# Patient Record
Sex: FEMALE | Race: WHITE | NOT HISPANIC OR LATINO | URBAN - NONMETROPOLITAN AREA
[De-identification: names, ages, dates, MRNs, and addresses within clinical notes are randomized per-mention and may not be internally consistent; named-entity substitution may affect disease eponyms.]

---

## 2023-09-07 ENCOUNTER — LAB OUTSIDE AN ENCOUNTER (OUTPATIENT)
Dept: URBAN - NONMETROPOLITAN AREA CLINIC 4 | Facility: CLINIC | Age: 78
End: 2023-09-07

## 2023-09-07 ENCOUNTER — OFFICE VISIT (OUTPATIENT)
Dept: URBAN - NONMETROPOLITAN AREA CLINIC 4 | Facility: CLINIC | Age: 78
End: 2023-09-07
Payer: MEDICARE

## 2023-09-07 ENCOUNTER — WEB ENCOUNTER (OUTPATIENT)
Dept: URBAN - NONMETROPOLITAN AREA CLINIC 4 | Facility: CLINIC | Age: 78
End: 2023-09-07

## 2023-09-07 VITALS
WEIGHT: 176.8 LBS | HEART RATE: 113 BPM | BODY MASS INDEX: 28.42 KG/M2 | DIASTOLIC BLOOD PRESSURE: 93 MMHG | HEIGHT: 66 IN | TEMPERATURE: 97.5 F | SYSTOLIC BLOOD PRESSURE: 105 MMHG

## 2023-09-07 DIAGNOSIS — M17.4 OTHER SECONDARY OSTEOARTHRITIS OF BOTH KNEES: ICD-10-CM

## 2023-09-07 DIAGNOSIS — K51.00 ULCERATIVE PANCOLITIS: ICD-10-CM

## 2023-09-07 PROBLEM — 239873007: Status: ACTIVE | Noted: 2023-09-07

## 2023-09-07 PROCEDURE — 99204 OFFICE O/P NEW MOD 45 MIN: CPT | Performed by: INTERNAL MEDICINE

## 2023-09-07 PROCEDURE — 99244 OFF/OP CNSLTJ NEW/EST MOD 40: CPT | Performed by: INTERNAL MEDICINE

## 2023-09-07 RX ORDER — DIPHENHYDRAMINE HYDROCHLORIDE 25 MG/1
1 CAPSULE AT BEDTIME AS NEEDED CAPSULE ORAL ONCE A DAY
Status: ACTIVE | COMMUNITY

## 2023-09-07 RX ORDER — UBIDECARENONE 100 MG
AS DIRECTED CAPSULE ORAL
Status: ACTIVE | COMMUNITY

## 2023-09-07 RX ORDER — MONTELUKAST 10 MG/1
1 TABLET TABLET, FILM COATED ORAL ONCE A DAY
Status: ACTIVE | COMMUNITY

## 2023-09-07 RX ORDER — ZINC GLUCONATE 50 MG
1 TABLET TABLET ORAL ONCE A DAY
Status: ACTIVE | COMMUNITY

## 2023-09-07 RX ORDER — AMLODIPINE BESYLATE 5 MG/1
1 TABLET TABLET ORAL ONCE A DAY
Status: ACTIVE | COMMUNITY

## 2023-09-07 RX ORDER — LEVOTHYROXINE SODIUM 137 UG/1
1 TABLET IN THE MORNING ON AN EMPTY STOMACH TABLET ORAL ONCE A DAY
Status: ACTIVE | COMMUNITY

## 2023-09-07 RX ORDER — SOD SULF/POT CHLORIDE/MAG SULF 1.479 G
12 TABLETS THE FIRST DOSE THE EVENING BEFORE AND SECOND DOSE THE MORNING OF COLONOSCOPY TABLET ORAL TWICE A DAY
Qty: 24 | OUTPATIENT
Start: 2023-09-07 | End: 2023-09-08

## 2023-09-07 RX ORDER — DULOXETINE 20 MG/1
1 CAPSULE CAPSULE, DELAYED RELEASE PELLETS ORAL TWICE A DAY
Status: ACTIVE | COMMUNITY

## 2023-09-07 RX ORDER — GABAPENTIN 300 MG/1
1 CAPSULE CAPSULE ORAL ONCE A DAY
Status: ACTIVE | COMMUNITY

## 2023-09-07 NOTE — HPI-TODAY'S VISIT:
Patient reports that she is changing physicians.  Pt reports that she has been seeing her GI doctor since around the year 2000.  Pt reports that she was seeing Dr. Anand.  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   Pt reports that her father was a binge drinking.  Pt reports that she suffered for years.  Pt reports that around 1996- was official diagnosis. Prescribed sulfasalazine- placed on that medication for a week.  Pt reports that she has been  on the 5asa since that time. Pt reports that the medication worked ok until a few years ago.. pt was prescribed something else which did not help- Sulfasalazine has worked just fine. Pt reports that recently she has noted soft stools, pudding like.   Pt reports that she initially thought she was lactose intolerance- started on almond milk- increased gaseousness as well. Mother had colon cancer age 45- Overdue for c scope.  Pt reports that she had a brother requiring ileostomy in 40's.  Younger brother has IBD as well.   Pt reports that he was on azacol.   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 Samanta Caballero for eval and tx. A copy will be sent

## 2023-09-08 LAB
A/G RATIO: 2.2
ABSOLUTE BASOPHILS: 31
ABSOLUTE EOSINOPHILS: 200
ABSOLUTE LYMPHOCYTES: 1856
ABSOLUTE MONOCYTES: 770
ABSOLUTE NEUTROPHILS: 4843
ALBUMIN: 4.3
ALKALINE PHOSPHATASE: 77
ALT (SGPT): 24
AST (SGOT): 30
BASOPHILS: 0.4
BILIRUBIN, TOTAL: 0.7
BUN/CREATININE RATIO: (no result)
BUN: 11
C-REACTIVE PROTEIN, QUANT: 0.3
CALCIUM: 9.7
CARBON DIOXIDE, TOTAL: 28
CHLORIDE: 107
CREATININE: 0.93
EGFR: 64
EOSINOPHILS: 2.6
FOLATE (FOLIC ACID), SERUM: >24
GLOBULIN, TOTAL: 2
GLUCOSE: 83
HEMATOCRIT: 42.2
HEMOGLOBIN: 12.7
LYMPHOCYTES: 24.1
MCH: 27.7
MCHC: 30.1
MCV: 91.9
MONOCYTES: 10
MPV: 9.3
NEUTROPHILS: 62.9
PLATELET COUNT: 193
POTASSIUM: 4.1
PROTEIN, TOTAL: 6.3
RDW: 13.7
RED BLOOD CELL COUNT: 4.59
SODIUM: 142
VITAMIN B12: 388
VITAMIN D,25-OH,TOTAL,IA: 48
WHITE BLOOD CELL COUNT: 7.7

## 2023-10-02 ENCOUNTER — LAB OUTSIDE AN ENCOUNTER (OUTPATIENT)
Dept: URBAN - METROPOLITAN AREA CLINIC 54 | Facility: CLINIC | Age: 78
End: 2023-10-02

## 2023-10-07 LAB
CALPROTECTIN, FECAL: 408
CAMPYLOBACTER GROUP: NOT DETECTED
NOROVIRUS GI/GII: NOT DETECTED
ROTAVIRUS A: NOT DETECTED
SALMONELLA SPECIES: NOT DETECTED
SHIGA TOXIN 1: NOT DETECTED
SHIGA TOXIN 2: NOT DETECTED
SHIGELLA SPECIES: NOT DETECTED
VIBRIO GROUP: NOT DETECTED
YERSINIA ENTEROCOLITICA: NOT DETECTED

## 2023-10-18 ENCOUNTER — OFFICE VISIT (OUTPATIENT)
Dept: URBAN - NONMETROPOLITAN AREA CLINIC 4 | Facility: CLINIC | Age: 78
End: 2023-10-18
Payer: MEDICARE

## 2023-10-18 ENCOUNTER — OFFICE VISIT (OUTPATIENT)
Dept: URBAN - NONMETROPOLITAN AREA CLINIC 4 | Facility: CLINIC | Age: 78
End: 2023-10-18

## 2023-10-18 ENCOUNTER — LAB OUTSIDE AN ENCOUNTER (OUTPATIENT)
Dept: URBAN - NONMETROPOLITAN AREA CLINIC 4 | Facility: CLINIC | Age: 78
End: 2023-10-18

## 2023-10-18 VITALS
HEART RATE: 80 BPM | DIASTOLIC BLOOD PRESSURE: 71 MMHG | TEMPERATURE: 97.7 F | BODY MASS INDEX: 28.35 KG/M2 | WEIGHT: 176.4 LBS | SYSTOLIC BLOOD PRESSURE: 141 MMHG | HEIGHT: 66 IN

## 2023-10-18 DIAGNOSIS — K51.00 ULCERATIVE PANCOLITIS: ICD-10-CM

## 2023-10-18 DIAGNOSIS — Z86.010 PERSONAL HISTORY OF COLONIC POLYPS: ICD-10-CM

## 2023-10-18 DIAGNOSIS — R19.5 ELEVATED FECAL CALPROTECTIN: ICD-10-CM

## 2023-10-18 DIAGNOSIS — M17.4 OTHER SECONDARY OSTEOARTHRITIS OF BOTH KNEES: ICD-10-CM

## 2023-10-18 PROBLEM — 428283002: Status: ACTIVE | Noted: 2023-10-18

## 2023-10-18 PROBLEM — 444548001: Status: ACTIVE | Noted: 2023-09-07

## 2023-10-18 PROCEDURE — 99214 OFFICE O/P EST MOD 30 MIN: CPT | Performed by: REGISTERED NURSE

## 2023-10-18 RX ORDER — AMLODIPINE BESYLATE 5 MG/1
1 TABLET TABLET ORAL ONCE A DAY
Status: ACTIVE | COMMUNITY

## 2023-10-18 RX ORDER — DULOXETINE 20 MG/1
1 CAPSULE CAPSULE, DELAYED RELEASE PELLETS ORAL TWICE A DAY
Status: ACTIVE | COMMUNITY

## 2023-10-18 RX ORDER — UBIDECARENONE 100 MG
AS DIRECTED CAPSULE ORAL
Status: ACTIVE | COMMUNITY

## 2023-10-18 RX ORDER — GABAPENTIN 300 MG/1
1 CAPSULE CAPSULE ORAL ONCE A DAY
Status: ACTIVE | COMMUNITY

## 2023-10-18 RX ORDER — DIPHENHYDRAMINE HYDROCHLORIDE 25 MG/1
1 CAPSULE AT BEDTIME AS NEEDED CAPSULE ORAL ONCE A DAY
Status: ACTIVE | COMMUNITY

## 2023-10-18 RX ORDER — LEVOTHYROXINE SODIUM 137 UG/1
1 TABLET IN THE MORNING ON AN EMPTY STOMACH TABLET ORAL ONCE A DAY
Status: ACTIVE | COMMUNITY

## 2023-10-18 RX ORDER — SOD SULF/POT CHLORIDE/MAG SULF 1.479 G
12 TABLETS THE FIRST DOSE THE EVENING BEFORE AND SECOND DOSE THE MORNING OF COLONOSCOPY TABLET ORAL TWICE A DAY
Qty: 24 TABLET | Refills: 0 | OUTPATIENT
Start: 2023-10-18 | End: 2023-10-19

## 2023-10-18 RX ORDER — MONTELUKAST 10 MG/1
1 TABLET TABLET, FILM COATED ORAL ONCE A DAY
Status: ACTIVE | COMMUNITY

## 2023-10-18 RX ORDER — ZINC GLUCONATE 50 MG
1 TABLET TABLET ORAL ONCE A DAY
Status: ACTIVE | COMMUNITY

## 2023-10-18 RX ORDER — SULFASALAZINE 500 MG/1
2 TABLETS TABLET, DELAYED RELEASE ORAL THREE TIMES A DAY
Qty: 180 TABLET | Refills: 5

## 2023-10-18 RX ORDER — SULFASALAZINE 500 MG/1
2 TABLETS TABLET, DELAYED RELEASE ORAL THREE TIMES A DAY
Status: ACTIVE | COMMUNITY

## 2023-10-18 RX ORDER — FOLIC ACID 1 MG/1
1 TABLET TABLET ORAL ONCE A DAY
Qty: 30 TABLET | Refills: 5

## 2023-10-18 RX ORDER — FOLIC ACID 1 MG/1
1 TABLET TABLET ORAL ONCE A DAY
Status: ACTIVE | COMMUNITY

## 2023-10-18 NOTE — HPI-TODAY'S VISIT:
Patient reports that she is changing physicians.  Pt reports that she has been seeing her GI doctor since around the year 2000.  Pt reports that she was seeing Dr. Anand.  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   Pt reports that her father was a binge drinking.  Pt reports that she suffered for years.  Pt reports that around 1996- was official diagnosis. Prescribed sulfasalazine- placed on that medication for a week.  Pt reports that she has been  on the 5asa since that time. Pt reports that the medication worked ok until a few years ago.. pt was prescribed something else which did not help- Sulfasalazine has worked just fine. Pt reports that recently she has noted soft stools, pudding like.   Pt reports that she initially thought she was lactose intolerance- started on almond milk- increased gaseousness as well. Mother had colon cancer age 45- Overdue for c scope.  Pt reports that she had a brother requiring ileostomy in 40's.  Younger brother has IBD as well.   Pt reports that he was on azacol.   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 Samanta Caballero for eval and tx. A copy will be sent  10/18/23: Pt RTC for f/u. Fecal helena elevated at 408. GPP negative. CBC, CMP, CRP, Vit D, Vit B12 and folate wnl. last cscope 10/2020 with no active UC, 4 polyps. Past due for surveillance. She is currently taking sulfasalzine 3 g daily and folid acid 1 mg daily. She reports pudding like stools. Has anywhere from 0-3 BMs daily. Denies hematochezia. Reports foul smelling gas. She takes phazyme prn. Admits her diet is not great and she eats out a lot.

## 2023-10-27 ENCOUNTER — TELEPHONE ENCOUNTER (OUTPATIENT)
Dept: URBAN - NONMETROPOLITAN AREA CLINIC 4 | Facility: CLINIC | Age: 78
End: 2023-10-27

## 2023-11-10 ENCOUNTER — OFFICE VISIT (OUTPATIENT)
Dept: URBAN - METROPOLITAN AREA SURGERY CENTER 14 | Facility: SURGERY CENTER | Age: 78
End: 2023-11-10

## 2023-11-27 ENCOUNTER — OFFICE VISIT (OUTPATIENT)
Dept: URBAN - NONMETROPOLITAN AREA CLINIC 4 | Facility: CLINIC | Age: 78
End: 2023-11-27

## 2023-12-07 ENCOUNTER — OFFICE VISIT (OUTPATIENT)
Dept: URBAN - NONMETROPOLITAN AREA CLINIC 4 | Facility: CLINIC | Age: 78
End: 2023-12-07

## 2023-12-15 ENCOUNTER — OUT OF OFFICE VISIT (OUTPATIENT)
Dept: URBAN - METROPOLITAN AREA SURGERY CENTER 14 | Facility: SURGERY CENTER | Age: 78
End: 2023-12-15
Payer: MEDICARE

## 2023-12-15 ENCOUNTER — CLAIMS CREATED FROM THE CLAIM WINDOW (OUTPATIENT)
Dept: URBAN - METROPOLITAN AREA CLINIC 4 | Facility: CLINIC | Age: 78
End: 2023-12-15
Payer: MEDICARE

## 2023-12-15 ENCOUNTER — CLAIMS CREATED FROM THE CLAIM WINDOW (OUTPATIENT)
Dept: URBAN - METROPOLITAN AREA SURGERY CENTER 14 | Facility: SURGERY CENTER | Age: 78
End: 2023-12-15

## 2023-12-15 DIAGNOSIS — K51.00 ACUTE ULCERATIVE PANCOLITIS: ICD-10-CM

## 2023-12-15 DIAGNOSIS — D12.4 ADENOMA OF DESCENDING COLON: ICD-10-CM

## 2023-12-15 DIAGNOSIS — D12.4 ADENOMATOUS POLYP OF DESCENDING COLON: ICD-10-CM

## 2023-12-15 DIAGNOSIS — K64.4 SKIN TAG OF PERIANAL REGION: ICD-10-CM

## 2023-12-15 DIAGNOSIS — K63.89 OTHER SPECIFIED DISEASES OF INTESTINE: ICD-10-CM

## 2023-12-15 DIAGNOSIS — K57.30 COLON, DIVERTICULOSIS: ICD-10-CM

## 2023-12-15 DIAGNOSIS — K51.00 CHRONIC ULCERATIVE PANCOLITIS: ICD-10-CM

## 2023-12-15 PROCEDURE — 45380 COLONOSCOPY AND BIOPSY: CPT | Performed by: INTERNAL MEDICINE

## 2023-12-15 PROCEDURE — 00811 ANES LWR INTST NDSC NOS: CPT

## 2023-12-15 PROCEDURE — 88305 TISSUE EXAM BY PATHOLOGIST: CPT | Performed by: PATHOLOGY

## 2023-12-15 PROCEDURE — G8907 PT DOC NO EVENTS ON DISCHARG: HCPCS | Performed by: INTERNAL MEDICINE

## 2024-01-11 ENCOUNTER — OFFICE VISIT (OUTPATIENT)
Dept: URBAN - NONMETROPOLITAN AREA CLINIC 4 | Facility: CLINIC | Age: 79
End: 2024-01-11
Payer: MEDICARE

## 2024-01-11 VITALS
DIASTOLIC BLOOD PRESSURE: 73 MMHG | TEMPERATURE: 97.9 F | WEIGHT: 172.2 LBS | HEART RATE: 72 BPM | BODY MASS INDEX: 27.68 KG/M2 | SYSTOLIC BLOOD PRESSURE: 138 MMHG | HEIGHT: 66 IN

## 2024-01-11 DIAGNOSIS — K51.00 ULCERATIVE PANCOLITIS: ICD-10-CM

## 2024-01-11 DIAGNOSIS — Z86.010 PERSONAL HISTORY OF COLONIC POLYPS: ICD-10-CM

## 2024-01-11 DIAGNOSIS — M17.4 OTHER SECONDARY OSTEOARTHRITIS OF BOTH KNEES: ICD-10-CM

## 2024-01-11 DIAGNOSIS — R19.5 ELEVATED FECAL CALPROTECTIN: ICD-10-CM

## 2024-01-11 PROCEDURE — 99214 OFFICE O/P EST MOD 30 MIN: CPT | Performed by: PHYSICIAN ASSISTANT

## 2024-01-11 RX ORDER — ZINC GLUCONATE 50 MG
1 TABLET TABLET ORAL ONCE A DAY
Status: ACTIVE | COMMUNITY

## 2024-01-11 RX ORDER — GABAPENTIN 300 MG/1
1 CAPSULE CAPSULE ORAL ONCE A DAY
Status: ACTIVE | COMMUNITY

## 2024-01-11 RX ORDER — LEVOTHYROXINE SODIUM 137 UG/1
1 TABLET IN THE MORNING ON AN EMPTY STOMACH TABLET ORAL ONCE A DAY
Status: ACTIVE | COMMUNITY

## 2024-01-11 RX ORDER — UBIDECARENONE 100 MG
AS DIRECTED CAPSULE ORAL
Status: ACTIVE | COMMUNITY

## 2024-01-11 RX ORDER — FOLIC ACID 1 MG/1
1 TABLET TABLET ORAL ONCE A DAY
Qty: 30 TABLET | Refills: 5

## 2024-01-11 RX ORDER — OMEGA-3S/DHA/EPA/FISH OIL 1000-1400
AS DIRECTED CAPSULE,DELAYED RELEASE (ENTERIC COATED) ORAL DAILY
Qty: 30 | Refills: 3 | OUTPATIENT
Start: 2024-01-11 | End: 2024-05-10

## 2024-01-11 RX ORDER — FOLIC ACID 1 MG/1
1 TABLET TABLET ORAL ONCE A DAY
Qty: 30 TABLET | Refills: 5 | Status: ACTIVE | COMMUNITY

## 2024-01-11 RX ORDER — AMLODIPINE BESYLATE 5 MG/1
1 TABLET TABLET ORAL ONCE A DAY
Status: ACTIVE | COMMUNITY

## 2024-01-11 RX ORDER — DULOXETINE 20 MG/1
1 CAPSULE CAPSULE, DELAYED RELEASE PELLETS ORAL TWICE A DAY
Status: DISCONTINUED | COMMUNITY

## 2024-01-11 RX ORDER — ROSUVASTATIN CALCIUM 20 MG/1
1 TABLET TABLET, COATED ORAL ONCE A DAY
Status: ACTIVE | COMMUNITY

## 2024-01-11 RX ORDER — SULFASALAZINE 500 MG/1
2 TABLETS TABLET, DELAYED RELEASE ORAL THREE TIMES A DAY
Qty: 180 TABLET | Refills: 5

## 2024-01-11 RX ORDER — DIPHENHYDRAMINE HYDROCHLORIDE 25 MG/1
1 CAPSULE AT BEDTIME AS NEEDED CAPSULE ORAL ONCE A DAY
Status: ACTIVE | COMMUNITY

## 2024-01-11 RX ORDER — SULFASALAZINE 500 MG/1
2 TABLETS TABLET, DELAYED RELEASE ORAL TWICE A DAY
Qty: 120 TABLET | Refills: 5 | Status: ACTIVE | COMMUNITY

## 2024-01-11 RX ORDER — MONTELUKAST 10 MG/1
1 TABLET TABLET, FILM COATED ORAL ONCE A DAY
Status: ACTIVE | COMMUNITY

## 2024-01-11 NOTE — HPI-TODAY'S VISIT:
Patient reports that she is changing physicians.  Pt reports that she has been seeing her GI doctor since around the year 2000.  Pt reports that she was seeing Dr. Anand.  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   Pt reports that her father was a binge drinking.  Pt reports that she suffered for years.  Pt reports that around 1996- was official diagnosis. Prescribed sulfasalazine- placed on that medication for a week.  Pt reports that she has been  on the 5asa since that time. Pt reports that the medication worked ok until a few years ago.. pt was prescribed something else which did not help- Sulfasalazine has worked just fine. Pt reports that recently she has noted soft stools, pudding like.   Pt reports that she initially thought she was lactose intolerance- started on almond milk- increased gaseousness as well. Mother had colon cancer age 45- Overdue for c scope.  Pt reports that she had a brother requiring ileostomy in 40's.  Younger brother has IBD as well.   Pt reports that he was on azacol.   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 Samanta Caballero for eval and tx. A copy will be sent  10/18/23: Pt RTC for f/u. Fecal helena elevated at 408. GPP negative. CBC, CMP, CRP, Vit D, Vit B12 and folate wnl. last cscope 10/2020 with no active UC, 4 polyps. Past due for surveillance. She is currently taking sulfasalzine 3 g daily and folid acid 1 mg daily. She reports pudding like stools. Has anywhere from 0-3 BMs daily. Denies hematochezia. Reports foul smelling gas. She takes phazyme prn. Admits her diet is not great and she eats out a lot.   1.11.24 discussed cscope with normal random colon bx as well TA s/p removal  maintained on Sulfasalazine for years for suspected UC  cscope from previous Dr. Anand nor our team with evidence of active disease

## 2024-03-11 ENCOUNTER — OV EP (OUTPATIENT)
Dept: URBAN - NONMETROPOLITAN AREA CLINIC 4 | Facility: CLINIC | Age: 79
End: 2024-03-11

## 2024-03-11 RX ORDER — ROSUVASTATIN CALCIUM 20 MG/1
1 TABLET TABLET, COATED ORAL ONCE A DAY
COMMUNITY

## 2024-03-11 RX ORDER — AMLODIPINE BESYLATE 5 MG/1
1 TABLET TABLET ORAL ONCE A DAY
COMMUNITY

## 2024-03-11 RX ORDER — LEVOTHYROXINE SODIUM 137 UG/1
1 TABLET IN THE MORNING ON AN EMPTY STOMACH TABLET ORAL ONCE A DAY
COMMUNITY

## 2024-03-11 RX ORDER — SULFASALAZINE 500 MG/1
2 TABLETS TABLET, DELAYED RELEASE ORAL THREE TIMES A DAY
Qty: 180 TABLET | Refills: 5 | COMMUNITY

## 2024-03-11 RX ORDER — MONTELUKAST 10 MG/1
1 TABLET TABLET, FILM COATED ORAL ONCE A DAY
COMMUNITY

## 2024-03-11 RX ORDER — GABAPENTIN 300 MG/1
1 CAPSULE CAPSULE ORAL ONCE A DAY
COMMUNITY

## 2024-03-11 RX ORDER — UBIDECARENONE 100 MG
AS DIRECTED CAPSULE ORAL
COMMUNITY

## 2024-03-11 RX ORDER — DIPHENHYDRAMINE HYDROCHLORIDE 25 MG/1
1 CAPSULE AT BEDTIME AS NEEDED CAPSULE ORAL ONCE A DAY
COMMUNITY

## 2024-03-11 RX ORDER — OMEGA-3S/DHA/EPA/FISH OIL 1000-1400
AS DIRECTED CAPSULE,DELAYED RELEASE (ENTERIC COATED) ORAL DAILY
Qty: 30 | Refills: 3 | COMMUNITY
Start: 2024-01-11 | End: 2024-05-10

## 2024-03-11 RX ORDER — FOLIC ACID 1 MG/1
1 TABLET TABLET ORAL ONCE A DAY
Qty: 30 TABLET | Refills: 5 | COMMUNITY

## 2024-03-11 RX ORDER — ZINC GLUCONATE 50 MG
1 TABLET TABLET ORAL ONCE A DAY
COMMUNITY

## 2024-03-14 ENCOUNTER — OV EP (OUTPATIENT)
Dept: URBAN - NONMETROPOLITAN AREA CLINIC 4 | Facility: CLINIC | Age: 79
End: 2024-03-14
Payer: MEDICARE

## 2024-03-14 VITALS
DIASTOLIC BLOOD PRESSURE: 93 MMHG | WEIGHT: 166.4 LBS | SYSTOLIC BLOOD PRESSURE: 168 MMHG | BODY MASS INDEX: 26.74 KG/M2 | HEIGHT: 66 IN | HEART RATE: 84 BPM | TEMPERATURE: 98.2 F

## 2024-03-14 DIAGNOSIS — R19.5 ELEVATED FECAL CALPROTECTIN: ICD-10-CM

## 2024-03-14 DIAGNOSIS — M17.4 OTHER SECONDARY OSTEOARTHRITIS OF BOTH KNEES: ICD-10-CM

## 2024-03-14 DIAGNOSIS — K51.00 ULCERATIVE PANCOLITIS: ICD-10-CM

## 2024-03-14 DIAGNOSIS — Z86.010 PERSONAL HISTORY OF COLONIC POLYPS: ICD-10-CM

## 2024-03-14 PROCEDURE — 99214 OFFICE O/P EST MOD 30 MIN: CPT | Performed by: PHYSICIAN ASSISTANT

## 2024-03-14 RX ORDER — OZANIMOD HYDROCHLORIDE 0.23-0.46
AS DIRECTED KIT ORAL DAILY
Qty: 1 PACK | Refills: 0 | OUTPATIENT
Start: 2024-03-14 | End: 2024-04-13

## 2024-03-14 RX ORDER — LEVOTHYROXINE SODIUM 137 UG/1
1 TABLET IN THE MORNING ON AN EMPTY STOMACH TABLET ORAL ONCE A DAY
COMMUNITY

## 2024-03-14 RX ORDER — SULFASALAZINE 500 MG/1
2 TABLETS TABLET, DELAYED RELEASE ORAL THREE TIMES A DAY
Qty: 180 TABLET | Refills: 5 | COMMUNITY

## 2024-03-14 RX ORDER — UBIDECARENONE 100 MG
AS DIRECTED CAPSULE ORAL
COMMUNITY

## 2024-03-14 RX ORDER — ZINC GLUCONATE 50 MG
1 TABLET TABLET ORAL ONCE A DAY
COMMUNITY

## 2024-03-14 RX ORDER — OMEGA-3S/DHA/EPA/FISH OIL 1000-1400
AS DIRECTED CAPSULE,DELAYED RELEASE (ENTERIC COATED) ORAL DAILY
Qty: 30 | Refills: 3 | COMMUNITY
Start: 2024-01-11 | End: 2024-05-10

## 2024-03-14 RX ORDER — AMLODIPINE BESYLATE 5 MG/1
1 TABLET TABLET ORAL ONCE A DAY
COMMUNITY

## 2024-03-14 RX ORDER — MONTELUKAST 10 MG/1
1 TABLET TABLET, FILM COATED ORAL ONCE A DAY
COMMUNITY

## 2024-03-14 RX ORDER — OZANIMOD HYDROCHLORIDE 0.92 MG/1
1 CAPSULE CAPSULE ORAL ONCE A DAY
Qty: 30 | OUTPATIENT
Start: 2024-03-14 | End: 2024-04-13

## 2024-03-14 RX ORDER — GABAPENTIN 300 MG/1
1 CAPSULE CAPSULE ORAL ONCE A DAY
COMMUNITY

## 2024-03-14 RX ORDER — DIPHENHYDRAMINE HYDROCHLORIDE 25 MG/1
1 CAPSULE AT BEDTIME AS NEEDED CAPSULE ORAL ONCE A DAY
COMMUNITY

## 2024-03-14 RX ORDER — ROSUVASTATIN CALCIUM 20 MG/1
1 TABLET TABLET, COATED ORAL ONCE A DAY
COMMUNITY

## 2024-03-14 RX ORDER — FOLIC ACID 1 MG/1
1 TABLET TABLET ORAL ONCE A DAY
Qty: 30 TABLET | Refills: 5 | COMMUNITY

## 2024-03-14 NOTE — HPI-TODAY'S VISIT:
Patient reports that she is changing physicians.  Pt reports that she has been seeing her GI doctor since around the year 2000.  Pt reports that she was seeing Dr. Anand.  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   Pt reports that her father was a binge drinking.  Pt reports that she suffered for years.  Pt reports that around 1996- was official diagnosis. Prescribed sulfasalazine- placed on that medication for a week.  Pt reports that she has been  on the 5asa since that time. Pt reports that the medication worked ok until a few years ago.. pt was prescribed something else which did not help- Sulfasalazine has worked just fine. Pt reports that recently she has noted soft stools, pudding like.   Pt reports that she initially thought she was lactose intolerance- started on almond milk- increased gaseousness as well. Mother had colon cancer age 45- Overdue for c scope.  Pt reports that she had a brother requiring ileostomy in 40's.  Younger brother has IBD as well.   Pt reports that he was on azacol.   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 Samanta Caballero for eval and tx. A copy will be sent  10/18/23: Pt RTC for f/u. Fecal helena elevated at 408. GPP negative. CBC, CMP, CRP, Vit D, Vit B12 and folate wnl. last cscope 10/2020 with no active UC, 4 polyps. Past due for surveillance. She is currently taking sulfasalzine 3 g daily and folid acid 1 mg daily. She reports pudding like stools. Has anywhere from 0-3 BMs daily. Denies hematochezia. Reports foul smelling gas. She takes phazyme prn. Admits her diet is not great and she eats out a lot.   1.11.24 discussed cscope with normal random colon bx as well TA s/p removal  maintained on Sulfasalazine for years for suspected UC  cscope from previous Dr. Anand nor our team with evidence of active disease  3.14.24 Having urgency and excessive gas with looser stools, and notices her symptoms are not as controlled as they were 2-3 years ago  Fecal helena also elevated at 457  Prev on Inflixmab when diagnosed, when her disease was severe  also has a brother with en ileostomy due to severe pan UC

## 2024-05-02 ENCOUNTER — TELEPHONE ENCOUNTER (OUTPATIENT)
Dept: URBAN - NONMETROPOLITAN AREA CLINIC 4 | Facility: CLINIC | Age: 79
End: 2024-05-02

## 2024-05-16 ENCOUNTER — TELEPHONE ENCOUNTER (OUTPATIENT)
Dept: URBAN - NONMETROPOLITAN AREA CLINIC 4 | Facility: CLINIC | Age: 79
End: 2024-05-16

## 2024-06-12 ENCOUNTER — ERX REFILL RESPONSE (OUTPATIENT)
Dept: URBAN - NONMETROPOLITAN AREA CLINIC 4 | Facility: CLINIC | Age: 79
End: 2024-06-12

## 2024-06-12 RX ORDER — OZANIMOD HYDROCHLORIDE 0.92 MG/1
TAKE 1 CAPSULE BY MOUTH EVERY DAY CAPSULE ORAL
Qty: 30 CAPSULE | Refills: 3 | OUTPATIENT

## 2024-06-12 RX ORDER — OZANIMOD HYDROCHLORIDE 0.92 MG/1
TAKE 1 CAPSULE BY MOUTH EVERY DAY CAPSULE ORAL
Qty: 30 CAPSULE | Refills: 0 | OUTPATIENT

## 2024-06-13 ENCOUNTER — OFFICE VISIT (OUTPATIENT)
Dept: URBAN - NONMETROPOLITAN AREA CLINIC 4 | Facility: CLINIC | Age: 79
End: 2024-06-13
Payer: MEDICARE

## 2024-06-13 ENCOUNTER — DASHBOARD ENCOUNTERS (OUTPATIENT)
Age: 79
End: 2024-06-13

## 2024-06-13 VITALS
SYSTOLIC BLOOD PRESSURE: 132 MMHG | WEIGHT: 157 LBS | HEART RATE: 68 BPM | DIASTOLIC BLOOD PRESSURE: 69 MMHG | BODY MASS INDEX: 25.23 KG/M2 | HEIGHT: 66 IN | TEMPERATURE: 98.4 F

## 2024-06-13 DIAGNOSIS — M17.4 OTHER SECONDARY OSTEOARTHRITIS OF BOTH KNEES: ICD-10-CM

## 2024-06-13 DIAGNOSIS — K51.00 ULCERATIVE PANCOLITIS: ICD-10-CM

## 2024-06-13 DIAGNOSIS — Z86.010 PERSONAL HISTORY OF COLONIC POLYPS: ICD-10-CM

## 2024-06-13 DIAGNOSIS — R19.5 ELEVATED FECAL CALPROTECTIN: ICD-10-CM

## 2024-06-13 PROCEDURE — 99214 OFFICE O/P EST MOD 30 MIN: CPT | Performed by: PHYSICIAN ASSISTANT

## 2024-06-13 RX ORDER — FOLIC ACID 1 MG/1
1 TABLET TABLET ORAL ONCE A DAY
Qty: 30 TABLET | Refills: 5

## 2024-06-13 RX ORDER — OZANIMOD HYDROCHLORIDE 0.92 MG/1
TAKE 1 CAPSULE BY MOUTH EVERY DAY CAPSULE ORAL
Qty: 30 CAPSULE | Refills: 3 | Status: ACTIVE | COMMUNITY

## 2024-06-13 RX ORDER — SULFASALAZINE 500 MG/1
2 TABLETS TABLET, DELAYED RELEASE ORAL THREE TIMES A DAY
Qty: 180 TABLET | Refills: 5 | Status: DISCONTINUED | COMMUNITY

## 2024-06-13 RX ORDER — LEVOTHYROXINE SODIUM 137 UG/1
1 TABLET IN THE MORNING ON AN EMPTY STOMACH TABLET ORAL ONCE A DAY
COMMUNITY

## 2024-06-13 RX ORDER — ROSUVASTATIN CALCIUM 20 MG/1
1 TABLET TABLET, COATED ORAL ONCE A DAY
COMMUNITY

## 2024-06-13 RX ORDER — DIPHENHYDRAMINE HYDROCHLORIDE 25 MG/1
1 CAPSULE AT BEDTIME AS NEEDED CAPSULE ORAL ONCE A DAY
COMMUNITY

## 2024-06-13 RX ORDER — FOLIC ACID 1 MG/1
1 TABLET TABLET ORAL ONCE A DAY
Qty: 30 TABLET | Refills: 5 | Status: ACTIVE | COMMUNITY
End: 2024-10-16

## 2024-06-13 RX ORDER — ZINC GLUCONATE 50 MG
1 TABLET TABLET ORAL ONCE A DAY
COMMUNITY

## 2024-06-13 RX ORDER — AMLODIPINE BESYLATE 5 MG/1
1 TABLET TABLET ORAL ONCE A DAY
COMMUNITY

## 2024-06-13 RX ORDER — UBIDECARENONE 100 MG
AS DIRECTED CAPSULE ORAL
COMMUNITY

## 2024-06-13 RX ORDER — GABAPENTIN 300 MG/1
1 CAPSULE CAPSULE ORAL ONCE A DAY
Status: DISCONTINUED | COMMUNITY

## 2024-06-13 RX ORDER — MONTELUKAST 10 MG/1
1 TABLET TABLET, FILM COATED ORAL ONCE A DAY
COMMUNITY

## 2024-07-12 ENCOUNTER — TELEPHONE ENCOUNTER (OUTPATIENT)
Dept: URBAN - NONMETROPOLITAN AREA CLINIC 4 | Facility: CLINIC | Age: 79
End: 2024-07-12

## 2024-08-28 ENCOUNTER — TELEPHONE ENCOUNTER (OUTPATIENT)
Dept: URBAN - NONMETROPOLITAN AREA CLINIC 4 | Facility: CLINIC | Age: 79
End: 2024-08-28

## 2024-10-22 ENCOUNTER — ERX REFILL RESPONSE (OUTPATIENT)
Dept: URBAN - NONMETROPOLITAN AREA CLINIC 4 | Facility: CLINIC | Age: 79
End: 2024-10-22

## 2024-10-22 RX ORDER — OZANIMOD HYDROCHLORIDE 0.92 MG/1
TAKE 1 CAPSULE BY MOUTH EVERY DAY CAPSULE ORAL
Qty: 30 CAPSULE | Refills: 3 | OUTPATIENT

## 2024-12-11 ENCOUNTER — OFFICE VISIT (OUTPATIENT)
Dept: URBAN - NONMETROPOLITAN AREA CLINIC 4 | Facility: CLINIC | Age: 79
End: 2024-12-11
Payer: MEDICARE

## 2024-12-11 VITALS
HEART RATE: 69 BPM | TEMPERATURE: 98.1 F | SYSTOLIC BLOOD PRESSURE: 165 MMHG | BODY MASS INDEX: 22.98 KG/M2 | HEIGHT: 66 IN | DIASTOLIC BLOOD PRESSURE: 86 MMHG | WEIGHT: 143 LBS

## 2024-12-11 DIAGNOSIS — R19.5 ELEVATED FECAL CALPROTECTIN: ICD-10-CM

## 2024-12-11 DIAGNOSIS — M17.4 OTHER SECONDARY OSTEOARTHRITIS OF BOTH KNEES: ICD-10-CM

## 2024-12-11 DIAGNOSIS — Z86.0101 HISTORY OF ADENOMATOUS AND SERRATED COLON POLYPS: ICD-10-CM

## 2024-12-11 DIAGNOSIS — K51.00 ULCERATIVE PANCOLITIS: ICD-10-CM

## 2024-12-11 PROCEDURE — 99214 OFFICE O/P EST MOD 30 MIN: CPT | Performed by: PHYSICIAN ASSISTANT

## 2024-12-11 RX ORDER — FOLIC ACID 1 MG/1
1 TABLET TABLET ORAL ONCE A DAY
Qty: 30 TABLET | Refills: 5 | Status: ACTIVE | COMMUNITY

## 2024-12-11 RX ORDER — ROSUVASTATIN CALCIUM 20 MG/1
1 TABLET TABLET, COATED ORAL ONCE A DAY
Status: ACTIVE | COMMUNITY

## 2024-12-11 RX ORDER — OZANIMOD HYDROCHLORIDE 0.92 MG/1
TAKE 1 CAPSULE BY MOUTH EVERY DAY CAPSULE ORAL
Qty: 30 CAPSULE | Refills: 3 | Status: ACTIVE | COMMUNITY

## 2024-12-11 RX ORDER — MONTELUKAST 10 MG/1
1 TABLET TABLET, FILM COATED ORAL ONCE A DAY
Status: ACTIVE | COMMUNITY

## 2024-12-11 RX ORDER — ZINC GLUCONATE 50 MG
1 TABLET TABLET ORAL ONCE A DAY
Status: ACTIVE | COMMUNITY

## 2024-12-11 RX ORDER — FOLIC ACID 1 MG/1
1 TABLET TABLET ORAL ONCE A DAY
Qty: 30 TABLET | Refills: 5

## 2024-12-11 RX ORDER — DIPHENHYDRAMINE HYDROCHLORIDE 25 MG/1
1 CAPSULE AT BEDTIME AS NEEDED CAPSULE ORAL ONCE A DAY
Status: ACTIVE | COMMUNITY

## 2024-12-11 RX ORDER — UBIDECARENONE 100 MG
AS DIRECTED CAPSULE ORAL
Status: ACTIVE | COMMUNITY

## 2024-12-11 RX ORDER — LEVOTHYROXINE SODIUM 137 UG/1
1 TABLET IN THE MORNING ON AN EMPTY STOMACH TABLET ORAL ONCE A DAY
Status: ACTIVE | COMMUNITY

## 2024-12-11 RX ORDER — OZANIMOD HYDROCHLORIDE 0.92 MG/1
TAKE 1 CAPSULE BY MOUTH EVERY DAY CAPSULE ORAL ONCE A DAY
Qty: 90 CAPSULE | Refills: 3

## 2024-12-11 RX ORDER — AMLODIPINE BESYLATE 5 MG/1
1 TABLET TABLET ORAL ONCE A DAY
Status: ACTIVE | COMMUNITY

## 2024-12-11 NOTE — HPI-TODAY'S VISIT:
Patient reports that she is changing physicians.  Pt reports that she has been seeing her GI doctor since around the year 2000.  Pt reports that she was seeing Dr. Anand.  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   Pt reports that her father was a binge drinking.  Pt reports that she suffered for years.  Pt reports that around 1996- was official diagnosis. Prescribed sulfasalazine- placed on that medication for a week.  Pt reports that she has been  on the 5asa since that time. Pt reports that the medication worked ok until a few years ago.. pt was prescribed something else which did not help- Sulfasalazine has worked just fine. Pt reports that recently she has noted soft stools, pudding like.   Pt reports that she initially thought she was lactose intolerance- started on almond milk- increased gaseousness as well. Mother had colon cancer age 45- Overdue for c scope.  Pt reports that she had a brother requiring ileostomy in 40's.  Younger brother has IBD as well.   Pt reports that he was on azacol.   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 Samanta Caballero for eval and tx. A copy will be sent  10/18/23: Pt RTC for f/u. Fecal helena elevated at 408. GPP negative. CBC, CMP, CRP, Vit D, Vit B12 and folate wnl. last cscope 10/2020 with no active UC, 4 polyps. Past due for surveillance. She is currently taking sulfasalzine 3 g daily and folid acid 1 mg daily. She reports pudding like stools. Has anywhere from 0-3 BMs daily. Denies hematochezia. Reports foul smelling gas. She takes phazyme prn. Admits her diet is not great and she eats out a lot.   1.11.24 discussed cscope with normal random colon bx as well TA s/p removal  maintained on Sulfasalazine for years for suspected UC  cscope from previous Dr. Anand nor our team with evidence of active disease  3.14.24 Having urgency and excessive gas with looser stools, and notices her symptoms are not as controlled as they were 2-3 years ago  Fecal helena also elevated at 457  Prev on Inflixmab when diagnosed, when her disease was severe  also has a brother with en ileostomy due to severe pan UC  6.13.24 doing really well on Zeposia, on maintenance dosing now at 0.92 mcg daily fomed stools, no abd pain  transitioning to assisted living  12.11.24 Mervat is doing fantastically well!!  she is having normal formed stools with no abd pain her most recent cscope from 2024 with no evidence of active disease, tics and small TA- recall for 2028 noted  she is tolerating Zeposia with no issues

## 2024-12-12 ENCOUNTER — TELEPHONE ENCOUNTER (OUTPATIENT)
Dept: URBAN - NONMETROPOLITAN AREA CLINIC 4 | Facility: CLINIC | Age: 79
End: 2024-12-12

## 2025-01-28 ENCOUNTER — TELEPHONE ENCOUNTER (OUTPATIENT)
Dept: URBAN - NONMETROPOLITAN AREA CLINIC 4 | Facility: CLINIC | Age: 80
End: 2025-01-28

## 2025-01-28 RX ORDER — PLECANATIDE 3 MG/1
1 TABLET TABLET ORAL ONCE A DAY
Qty: 30 | OUTPATIENT
Start: 2025-01-29 | End: 2025-02-28

## 2025-01-31 ENCOUNTER — TELEPHONE ENCOUNTER (OUTPATIENT)
Dept: URBAN - METROPOLITAN AREA CLINIC 54 | Facility: CLINIC | Age: 80
End: 2025-01-31

## 2025-02-03 ENCOUNTER — TELEPHONE ENCOUNTER (OUTPATIENT)
Dept: URBAN - METROPOLITAN AREA CLINIC 54 | Facility: CLINIC | Age: 80
End: 2025-02-03

## 2025-02-05 ENCOUNTER — TELEPHONE ENCOUNTER (OUTPATIENT)
Dept: URBAN - NONMETROPOLITAN AREA CLINIC 4 | Facility: CLINIC | Age: 80
End: 2025-02-05

## 2025-02-07 ENCOUNTER — TELEPHONE ENCOUNTER (OUTPATIENT)
Dept: URBAN - NONMETROPOLITAN AREA CLINIC 4 | Facility: CLINIC | Age: 80
End: 2025-02-07

## 2025-02-12 ENCOUNTER — TELEPHONE ENCOUNTER (OUTPATIENT)
Dept: URBAN - NONMETROPOLITAN AREA CLINIC 4 | Facility: CLINIC | Age: 80
End: 2025-02-12

## 2025-02-12 RX ORDER — RIFAXIMIN 550 MG/1
1 TABLET TABLET ORAL THREE TIMES A DAY
Qty: 42 TABLET | Refills: 0 | OUTPATIENT
Start: 2025-02-13 | End: 2025-02-27

## 2025-02-14 ENCOUNTER — TELEPHONE ENCOUNTER (OUTPATIENT)
Dept: URBAN - METROPOLITAN AREA CLINIC 54 | Facility: CLINIC | Age: 80
End: 2025-02-14

## 2025-02-18 ENCOUNTER — TELEPHONE ENCOUNTER (OUTPATIENT)
Dept: URBAN - NONMETROPOLITAN AREA CLINIC 4 | Facility: CLINIC | Age: 80
End: 2025-02-18

## 2025-02-19 ENCOUNTER — OFFICE VISIT (OUTPATIENT)
Dept: URBAN - NONMETROPOLITAN AREA CLINIC 4 | Facility: CLINIC | Age: 80
End: 2025-02-19
Payer: MEDICARE

## 2025-02-19 VITALS
BODY MASS INDEX: 23.59 KG/M2 | SYSTOLIC BLOOD PRESSURE: 117 MMHG | HEIGHT: 66 IN | TEMPERATURE: 97.9 F | WEIGHT: 146.8 LBS | DIASTOLIC BLOOD PRESSURE: 68 MMHG | HEART RATE: 72 BPM

## 2025-02-19 DIAGNOSIS — M17.4 OTHER SECONDARY OSTEOARTHRITIS OF BOTH KNEES: ICD-10-CM

## 2025-02-19 DIAGNOSIS — R19.5 ELEVATED FECAL CALPROTECTIN: ICD-10-CM

## 2025-02-19 DIAGNOSIS — Z86.0101 H/O ADENOMATOUS POLYP OF COLON: ICD-10-CM

## 2025-02-19 DIAGNOSIS — K51.00 ULCERATIVE PANCOLITIS: ICD-10-CM

## 2025-02-19 PROCEDURE — 99214 OFFICE O/P EST MOD 30 MIN: CPT | Performed by: PHYSICIAN ASSISTANT

## 2025-02-19 RX ORDER — LEVOTHYROXINE SODIUM 137 UG/1
1 TABLET IN THE MORNING ON AN EMPTY STOMACH TABLET ORAL ONCE A DAY
Status: ACTIVE | COMMUNITY

## 2025-02-19 RX ORDER — OZANIMOD HYDROCHLORIDE 0.92 MG/1
TAKE 1 CAPSULE BY MOUTH EVERY DAY CAPSULE ORAL ONCE A DAY
Qty: 90 CAPSULE | Refills: 3 | Status: ACTIVE | COMMUNITY

## 2025-02-19 RX ORDER — RIFAXIMIN 550 MG/1
1 TABLET TABLET ORAL THREE TIMES A DAY
Qty: 42 TABLET | Refills: 0 | Status: ACTIVE | COMMUNITY
Start: 2025-02-13 | End: 2025-02-27

## 2025-02-19 RX ORDER — DIPHENHYDRAMINE HYDROCHLORIDE 25 MG/1
1 CAPSULE AT BEDTIME AS NEEDED CAPSULE ORAL ONCE A DAY
Status: ACTIVE | COMMUNITY

## 2025-02-19 RX ORDER — UBIDECARENONE 100 MG
AS DIRECTED CAPSULE ORAL
Status: ACTIVE | COMMUNITY

## 2025-02-19 RX ORDER — MONTELUKAST 10 MG/1
1 TABLET TABLET, FILM COATED ORAL ONCE A DAY
Status: ACTIVE | COMMUNITY

## 2025-02-19 RX ORDER — ROSUVASTATIN CALCIUM 20 MG/1
1 TABLET TABLET, COATED ORAL ONCE A DAY
Status: ACTIVE | COMMUNITY

## 2025-02-19 RX ORDER — PLECANATIDE 3 MG/1
1 TABLET TABLET ORAL ONCE A DAY
Qty: 30 | Status: ON HOLD | COMMUNITY
Start: 2025-01-29 | End: 2025-02-28

## 2025-02-19 RX ORDER — ZINC GLUCONATE 50 MG
1 TABLET TABLET ORAL ONCE A DAY
Status: ON HOLD | COMMUNITY

## 2025-02-19 RX ORDER — OZANIMOD HYDROCHLORIDE 0.92 MG/1
TAKE 1 CAPSULE BY MOUTH EVERY DAY CAPSULE ORAL ONCE A DAY
Qty: 90 CAPSULE | Refills: 3

## 2025-02-19 RX ORDER — FOLIC ACID 1 MG/1
1 TABLET TABLET ORAL ONCE A DAY
Qty: 30 TABLET | Refills: 5 | Status: ON HOLD | COMMUNITY

## 2025-02-19 RX ORDER — AMLODIPINE BESYLATE 5 MG/1
1 TABLET TABLET ORAL ONCE A DAY
Status: ACTIVE | COMMUNITY

## 2025-02-19 RX ORDER — FOLIC ACID 1 MG/1
1 TABLET TABLET ORAL ONCE A DAY
Qty: 30 TABLET | Refills: 5

## 2025-02-19 NOTE — HPI-TODAY'S VISIT:
Patient called stating that she has concerns regarding the renal panel that was done on Saturday. Viewed results on [facility], would like to discuss.    Please advise    [phone]    Patient reports that she is changing physicians.  Pt reports that she has been seeing her GI doctor since around the year 2000.  Pt reports that she was seeing Dr. Anand.  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   Pt reports that her father was a binge drinking.  Pt reports that she suffered for years.  Pt reports that around 1996- was official diagnosis. Prescribed sulfasalazine- placed on that medication for a week.  Pt reports that she has been  on the 5asa since that time. Pt reports that the medication worked ok until a few years ago.. pt was prescribed something else which did not help- Sulfasalazine has worked just fine. Pt reports that recently she has noted soft stools, pudding like.   Pt reports that she initially thought she was lactose intolerance- started on almond milk- increased gaseousness as well. Mother had colon cancer age 45- Overdue for c scope.  Pt reports that she had a brother requiring ileostomy in 40's.  Younger brother has IBD as well.   Pt reports that he was on azacol.   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 Samanta Caballero for eval and tx. A copy will be sent  10/18/23: Pt RTC for f/u. Fecal helena elevated at 408. GPP negative. CBC, CMP, CRP, Vit D, Vit B12 and folate wnl. last cscope 10/2020 with no active UC, 4 polyps. Past due for surveillance. She is currently taking sulfasalzine 3 g daily and folid acid 1 mg daily. She reports pudding like stools. Has anywhere from 0-3 BMs daily. Denies hematochezia. Reports foul smelling gas. She takes phazyme prn. Admits her diet is not great and she eats out a lot.   1.11.24 discussed cscope with normal random colon bx as well TA s/p removal  maintained on Sulfasalazine for years for suspected UC  cscope from previous Dr. Anand nor our team with evidence of active disease  3.14.24 Having urgency and excessive gas with looser stools, and notices her symptoms are not as controlled as they were 2-3 years ago  Fecal helena also elevated at 457  Prev on Inflixmab when diagnosed, when her disease was severe  also has a brother with en ileostomy due to severe pan UC  6.13.24 doing really well on Zeposia, on maintenance dosing now at 0.92 mcg daily fomed stools, no abd pain  transitioning to assisted living  12.11.24 Mervat is doing fantastically well!!  she is having normal formed stools with no abd pain her most recent cscope from 2024 with no evidence of active disease, tics and small TA- recall for 2028 noted  she is tolerating Zeposia with no issues  2.19.25 patient is doing great on Zeposia. Occasional gas and stool change, which makes it hard for complete emptying but no diarrhea  responded well with Xifaxan last time, and was given aain this time, but was not taking correctly

## 2025-03-03 ENCOUNTER — TELEPHONE ENCOUNTER (OUTPATIENT)
Dept: URBAN - NONMETROPOLITAN AREA CLINIC 4 | Facility: CLINIC | Age: 80
End: 2025-03-03

## 2025-03-03 RX ORDER — RIFAXIMIN 550 MG/1
1 TABLET TABLET ORAL THREE TIMES A DAY
Qty: 42 TABLET | Refills: 0 | OUTPATIENT
Start: 2025-03-03 | End: 2025-03-17

## 2025-03-25 ENCOUNTER — TELEPHONE ENCOUNTER (OUTPATIENT)
Dept: URBAN - NONMETROPOLITAN AREA CLINIC 4 | Facility: CLINIC | Age: 80
End: 2025-03-25

## 2025-04-03 ENCOUNTER — OFFICE VISIT (OUTPATIENT)
Dept: URBAN - NONMETROPOLITAN AREA CLINIC 4 | Facility: CLINIC | Age: 80
End: 2025-04-03

## 2025-04-03 ENCOUNTER — OFFICE VISIT (OUTPATIENT)
Dept: URBAN - NONMETROPOLITAN AREA CLINIC 4 | Facility: CLINIC | Age: 80
End: 2025-04-03
Payer: MEDICARE

## 2025-04-03 DIAGNOSIS — M17.4 OTHER SECONDARY OSTEOARTHRITIS OF BOTH KNEES: ICD-10-CM

## 2025-04-03 DIAGNOSIS — Z86.0101 H/O ADENOMATOUS POLYP OF COLON: ICD-10-CM

## 2025-04-03 DIAGNOSIS — K51.00 ULCERATIVE PANCOLITIS: ICD-10-CM

## 2025-04-03 DIAGNOSIS — R19.5 ELEVATED FECAL CALPROTECTIN: ICD-10-CM

## 2025-04-03 PROCEDURE — 99214 OFFICE O/P EST MOD 30 MIN: CPT | Performed by: PHYSICIAN ASSISTANT

## 2025-04-03 RX ORDER — ETRASIMOD 2 MG/1
1 TABLET TABLET, FILM COATED ORAL ONCE A DAY
Qty: 90 TABLET | Refills: 3 | OUTPATIENT
Start: 2025-04-03 | End: 2026-03-29

## 2025-04-03 RX ORDER — ZINC GLUCONATE 50 MG
1 TABLET TABLET ORAL ONCE A DAY
Status: ON HOLD | COMMUNITY

## 2025-04-03 RX ORDER — FOLIC ACID 1 MG/1
1 TABLET TABLET ORAL ONCE A DAY
Qty: 30 TABLET | Refills: 5
Start: 2025-04-03

## 2025-04-03 RX ORDER — OZANIMOD HYDROCHLORIDE 0.92 MG/1
TAKE 1 CAPSULE BY MOUTH EVERY DAY CAPSULE ORAL ONCE A DAY
Qty: 90 CAPSULE | Refills: 3 | Status: ACTIVE | COMMUNITY

## 2025-04-03 RX ORDER — MONTELUKAST 10 MG/1
1 TABLET TABLET, FILM COATED ORAL ONCE A DAY
Status: ACTIVE | COMMUNITY

## 2025-04-03 RX ORDER — FOLIC ACID 1 MG/1
1 TABLET TABLET ORAL ONCE A DAY
Qty: 30 TABLET | Refills: 5 | Status: ACTIVE | COMMUNITY

## 2025-04-03 RX ORDER — DIPHENHYDRAMINE HYDROCHLORIDE 25 MG/1
1 CAPSULE AT BEDTIME AS NEEDED CAPSULE ORAL ONCE A DAY
Status: ACTIVE | COMMUNITY

## 2025-04-03 RX ORDER — AMLODIPINE BESYLATE 5 MG/1
1 TABLET TABLET ORAL ONCE A DAY
Status: ACTIVE | COMMUNITY

## 2025-04-03 RX ORDER — UBIDECARENONE 100 MG
AS DIRECTED CAPSULE ORAL
Status: ACTIVE | COMMUNITY

## 2025-04-03 RX ORDER — LEVOTHYROXINE SODIUM 137 UG/1
1 TABLET IN THE MORNING ON AN EMPTY STOMACH TABLET ORAL ONCE A DAY
Status: ACTIVE | COMMUNITY

## 2025-04-03 RX ORDER — ROSUVASTATIN CALCIUM 20 MG/1
1 TABLET TABLET, COATED ORAL ONCE A DAY
Status: ACTIVE | COMMUNITY

## 2025-04-03 NOTE — HPI-TODAY'S VISIT:
Patient reports that she is changing physicians.  Pt reports that she has been seeing her GI doctor since around the year 2000.  Pt reports that she was seeing Dr. Anand.  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   Pt reports that her father was a binge drinking.  Pt reports that she suffered for years.  Pt reports that around 1996- was official diagnosis. Prescribed sulfasalazine- placed on that medication for a week.  Pt reports that she has been  on the 5asa since that time. Pt reports that the medication worked ok until a few years ago.. pt was prescribed something else which did not help- Sulfasalazine has worked just fine. Pt reports that recently she has noted soft stools, pudding like.   Pt reports that she initially thought she was lactose intolerance- started on almond milk- increased gaseousness as well. Mother had colon cancer age 45- Overdue for c scope.  Pt reports that she had a brother requiring ileostomy in 40's.  Younger brother has IBD as well.   Pt reports that he was on azacol.   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 Samanta Caballero for eval and tx. A copy will be sent  10/18/23: Pt RTC for f/u. Fecal helena elevated at 408. GPP negative. CBC, CMP, CRP, Vit D, Vit B12 and folate wnl. last cscope 10/2020 with no active UC, 4 polyps. Past due for surveillance. She is currently taking sulfasalzine 3 g daily and folid acid 1 mg daily. She reports pudding like stools. Has anywhere from 0-3 BMs daily. Denies hematochezia. Reports foul smelling gas. She takes phazyme prn. Admits her diet is not great and she eats out a lot.   1.11.24 discussed cscope with normal random colon bx as well TA s/p removal  maintained on Sulfasalazine for years for suspected UC  cscope from previous Dr. Anand nor our team with evidence of active disease  3.14.24 Having urgency and excessive gas with looser stools, and notices her symptoms are not as controlled as they were 2-3 years ago  Fecal helena also elevated at 457  Prev on Inflixmab when diagnosed, when her disease was severe  also has a brother with en ileostomy due to severe pan UC  6.13.24 doing really well on Zeposia, on maintenance dosing now at 0.92 mcg daily fomed stools, no abd pain  transitioning to assisted living  12.11.24 Mervat is doing fantastically well!!  she is having normal formed stools with no abd pain her most recent cscope from 2024 with no evidence of active disease, tics and small TA- recall for 2028 noted  she is tolerating Zeposia with no issues  2.19.25 patient is doing great on Zeposia. Occasional gas and stool change, which makes it hard for complete emptying but no diarrhea  responded well with Xifaxan last time, and was given aain this time, but was not taking correctly  4.3.25 hre today for follow up, has a ot of gas but was also drinking 5 cokes a day, and since she stopped that, is much better.  No diarrhea, and continues to have improved smptoms from her UC on current medication, Zeposia

## 2025-04-03 NOTE — PHYSICAL EXAM EYES:
Conjuntivae and eyelids appear normal, Sclerae : White without injection Much of the documentation for this visit was completed in the [facility] system. Please see the attached documentation for further details about this patient's care.     Carley Villa, ROBERTN

## 2025-04-04 ENCOUNTER — TELEPHONE ENCOUNTER (OUTPATIENT)
Dept: URBAN - NONMETROPOLITAN AREA CLINIC 4 | Facility: CLINIC | Age: 80
End: 2025-04-04

## 2025-04-09 ENCOUNTER — TELEPHONE ENCOUNTER (OUTPATIENT)
Dept: URBAN - NONMETROPOLITAN AREA CLINIC 4 | Facility: CLINIC | Age: 80
End: 2025-04-09

## 2025-04-16 ENCOUNTER — TELEPHONE ENCOUNTER (OUTPATIENT)
Dept: URBAN - NONMETROPOLITAN AREA CLINIC 4 | Facility: CLINIC | Age: 80
End: 2025-04-16

## 2025-04-23 ENCOUNTER — TELEPHONE ENCOUNTER (OUTPATIENT)
Dept: URBAN - METROPOLITAN AREA CLINIC 54 | Facility: CLINIC | Age: 80
End: 2025-04-23

## 2025-04-24 ENCOUNTER — TELEPHONE ENCOUNTER (OUTPATIENT)
Dept: URBAN - NONMETROPOLITAN AREA CLINIC 4 | Facility: CLINIC | Age: 80
End: 2025-04-24

## 2025-06-10 ENCOUNTER — OFFICE VISIT (OUTPATIENT)
Dept: URBAN - NONMETROPOLITAN AREA CLINIC 4 | Facility: CLINIC | Age: 80
End: 2025-06-10
Payer: MEDICARE

## 2025-06-10 DIAGNOSIS — Z86.0101 H/O ADENOMATOUS POLYP OF COLON: ICD-10-CM

## 2025-06-10 DIAGNOSIS — R19.5 ELEVATED FECAL CALPROTECTIN: ICD-10-CM

## 2025-06-10 DIAGNOSIS — K51.00 ULCERATIVE PANCOLITIS: ICD-10-CM

## 2025-06-10 DIAGNOSIS — M17.4 OTHER SECONDARY OSTEOARTHRITIS OF BOTH KNEES: ICD-10-CM

## 2025-06-10 PROCEDURE — 99214 OFFICE O/P EST MOD 30 MIN: CPT | Performed by: PHYSICIAN ASSISTANT

## 2025-06-10 RX ORDER — AMLODIPINE BESYLATE 5 MG/1
1 TABLET TABLET ORAL ONCE A DAY
Status: ACTIVE | COMMUNITY

## 2025-06-10 RX ORDER — MONTELUKAST 10 MG/1
1 TABLET TABLET, FILM COATED ORAL ONCE A DAY
Status: ACTIVE | COMMUNITY

## 2025-06-10 RX ORDER — ZINC GLUCONATE 50 MG
1 TABLET TABLET ORAL ONCE A DAY
Status: ON HOLD | COMMUNITY

## 2025-06-10 RX ORDER — FOLIC ACID 1 MG/1
1 TABLET TABLET ORAL ONCE A DAY
Qty: 30 TABLET | Refills: 5 | Status: ACTIVE | COMMUNITY
Start: 2025-04-03

## 2025-06-10 RX ORDER — LEVOTHYROXINE SODIUM 137 UG/1
1 TABLET IN THE MORNING ON AN EMPTY STOMACH TABLET ORAL ONCE A DAY
Status: ACTIVE | COMMUNITY

## 2025-06-10 RX ORDER — UBIDECARENONE 100 MG
AS DIRECTED CAPSULE ORAL
Status: ACTIVE | COMMUNITY

## 2025-06-10 RX ORDER — OZANIMOD HYDROCHLORIDE 0.92 MG/1
TAKE 1 CAPSULE BY MOUTH EVERY DAY CAPSULE ORAL ONCE A DAY
Qty: 90 CAPSULE | Refills: 3 | Status: ACTIVE | COMMUNITY

## 2025-06-10 RX ORDER — ETRASIMOD 2 MG/1
1 TABLET TABLET, FILM COATED ORAL ONCE A DAY
Qty: 90 TABLET | Refills: 3 | Status: ACTIVE | COMMUNITY
Start: 2025-04-03 | End: 2026-03-29

## 2025-06-10 RX ORDER — ETRASIMOD 2 MG/1
1 TABLET TABLET, FILM COATED ORAL ONCE A DAY
Qty: 90 TABLET | Refills: 3 | OUTPATIENT
Start: 2025-06-10 | End: 2026-06-05

## 2025-06-10 RX ORDER — DIPHENHYDRAMINE HYDROCHLORIDE 25 MG/1
1 CAPSULE AT BEDTIME AS NEEDED CAPSULE ORAL ONCE A DAY
Status: ACTIVE | COMMUNITY

## 2025-06-10 RX ORDER — ROSUVASTATIN CALCIUM 20 MG/1
1 TABLET TABLET, COATED ORAL ONCE A DAY
Status: ACTIVE | COMMUNITY

## 2025-06-10 RX ORDER — FOLIC ACID 1 MG/1
1 TABLET TABLET ORAL ONCE A DAY
Qty: 30 TABLET | Refills: 5
Start: 2025-06-10

## 2025-06-10 NOTE — HPI-TODAY'S VISIT:
Patient reports that she is changing physicians.  Pt reports that she has been seeing her GI doctor since around the year 2000.  Pt reports that she was seeing Dr. Anand.  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   Pt reports that her father was a binge drinking.  Pt reports that she suffered for years.  Pt reports that around 1996- was official diagnosis. Prescribed sulfasalazine- placed on that medication for a week.  Pt reports that she has been  on the 5asa since that time. Pt reports that the medication worked ok until a few years ago.. pt was prescribed something else which did not help- Sulfasalazine has worked just fine. Pt reports that recently she has noted soft stools, pudding like.   Pt reports that she initially thought she was lactose intolerance- started on almond milk- increased gaseousness as well. Mother had colon cancer age 45- Overdue for c scope.  Pt reports that she had a brother requiring ileostomy in 40's.  Younger brother has IBD as well.   Pt reports that he was on azacol.   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 Samanta Caballero for eval and tx. A copy will be sent  10/18/23: Pt RTC for f/u. Fecal helena elevated at 408. GPP negative. CBC, CMP, CRP, Vit D, Vit B12 and folate wnl. last cscope 10/2020 with no active UC, 4 polyps. Past due for surveillance. She is currently taking sulfasalzine 3 g daily and folid acid 1 mg daily. She reports pudding like stools. Has anywhere from 0-3 BMs daily. Denies hematochezia. Reports foul smelling gas. She takes phazyme prn. Admits her diet is not great and she eats out a lot.   1.11.24 discussed cscope with normal random colon bx as well TA s/p removal  maintained on Sulfasalazine for years for suspected UC  cscope from previous Dr. Anand nor our team with evidence of active disease  3.14.24 Having urgency and excessive gas with looser stools, and notices her symptoms are not as controlled as they were 2-3 years ago  Fecal helena also elevated at 457  Prev on Inflixmab when diagnosed, when her disease was severe  also has a brother with en ileostomy due to severe pan UC  6.13.24 doing really well on Zeposia, on maintenance dosing now at 0.92 mcg daily fomed stools, no abd pain  transitioning to assisted living  12.11.24 Mervat is doing fantastically well!!  she is having normal formed stools with no abd pain her most recent cscope from 2024 with no evidence of active disease, tics and small TA- recall for 2028 noted  she is tolerating Zeposia with no issues  2.19.25 patient is doing great on Zeposia. Occasional gas and stool change, which makes it hard for complete emptying but no diarrhea  responded well with Xifaxan last time, and was given aain this time, but was not taking correctly  4.3.25 hre today for follow up, has a ot of gas but was also drinking 5 cokes a day, and since she stopped that, is much better.  No diarrhea, and continues to have improved smptoms from her UC on current medication, Zeposia  6.10.25 doing reat on Velsipity!! no gas normal BMs and she feels the best she has in years!  hgb 11.5 4/2025, MCV normal last cscope 12/.2023 [place] 0 histo neg path  fecal helena <43

## 2025-06-19 ENCOUNTER — TELEPHONE ENCOUNTER (OUTPATIENT)
Dept: URBAN - NONMETROPOLITAN AREA CLINIC 4 | Facility: CLINIC | Age: 80
End: 2025-06-19

## 2025-08-18 ENCOUNTER — ERX REFILL RESPONSE (OUTPATIENT)
Dept: URBAN - NONMETROPOLITAN AREA CLINIC 4 | Facility: CLINIC | Age: 80
End: 2025-08-18

## 2025-08-18 RX ORDER — ETRASIMOD 2 MG/1
TAKE 1 TABLET BY MOUTH ONCE DAILY TABLET, FILM COATED ORAL
Qty: 30 TABLET | Refills: 5 | OUTPATIENT

## 2025-08-19 ENCOUNTER — OFFICE VISIT (OUTPATIENT)
Dept: URBAN - NONMETROPOLITAN AREA CLINIC 4 | Facility: CLINIC | Age: 80
End: 2025-08-19
Payer: MEDICARE

## 2025-08-19 DIAGNOSIS — M17.4 OTHER SECONDARY OSTEOARTHRITIS OF BOTH KNEES: ICD-10-CM

## 2025-08-19 DIAGNOSIS — R19.5 ELEVATED FECAL CALPROTECTIN: ICD-10-CM

## 2025-08-19 DIAGNOSIS — K51.00 ULCERATIVE PANCOLITIS: ICD-10-CM

## 2025-08-19 DIAGNOSIS — Z86.0101 H/O ADENOMATOUS POLYP OF COLON: ICD-10-CM

## 2025-08-19 DIAGNOSIS — Z09 ENCOUNTER FOR COLONOSCOPY FOLLOWING COLON POLYP REMOVAL: ICD-10-CM

## 2025-08-19 PROCEDURE — 99214 OFFICE O/P EST MOD 30 MIN: CPT | Performed by: PHYSICIAN ASSISTANT

## 2025-08-19 RX ORDER — AMLODIPINE BESYLATE 5 MG/1
1 TABLET TABLET ORAL ONCE A DAY
Status: ACTIVE | COMMUNITY

## 2025-08-19 RX ORDER — ZINC GLUCONATE 50 MG
1 TABLET TABLET ORAL ONCE A DAY
Status: ON HOLD | COMMUNITY

## 2025-08-19 RX ORDER — LEVOTHYROXINE SODIUM 137 UG/1
1 TABLET IN THE MORNING ON AN EMPTY STOMACH TABLET ORAL ONCE A DAY
Status: ACTIVE | COMMUNITY

## 2025-08-19 RX ORDER — FOLIC ACID 1 MG/1
1 TABLET TABLET ORAL ONCE A DAY
Qty: 30 TABLET | Refills: 5 | Status: ACTIVE | COMMUNITY
Start: 2025-06-10

## 2025-08-19 RX ORDER — UBIDECARENONE 100 MG
AS DIRECTED CAPSULE ORAL
Status: ACTIVE | COMMUNITY

## 2025-08-19 RX ORDER — OZANIMOD HYDROCHLORIDE 0.92 MG/1
TAKE 1 CAPSULE BY MOUTH EVERY DAY CAPSULE ORAL ONCE A DAY
Qty: 90 CAPSULE | Refills: 3
Start: 2025-08-19

## 2025-08-19 RX ORDER — SOD SULF/POT CHLORIDE/MAG SULF 1.479 G
12 TABLETS THE FIRST DOSE THE EVENING BEFORE AND SECOND DOSE THE MORNING OF COLONOSCOPY TABLET ORAL TWICE A DAY
Qty: 24 | OUTPATIENT
Start: 2025-08-20 | End: 2025-08-21

## 2025-08-19 RX ORDER — ETRASIMOD 2 MG/1
1 TABLET TABLET, FILM COATED ORAL ONCE A DAY
Qty: 90 TABLET | Refills: 3 | OUTPATIENT
Start: 2025-08-19 | End: 2026-08-14

## 2025-08-19 RX ORDER — FOLIC ACID 1 MG/1
1 TABLET TABLET ORAL ONCE A DAY
Qty: 30 TABLET | Refills: 5
Start: 2025-08-19

## 2025-08-19 RX ORDER — ETRASIMOD 2 MG/1
TAKE 1 TABLET BY MOUTH ONCE DAILY TABLET, FILM COATED ORAL
Qty: 30 TABLET | Refills: 5 | OUTPATIENT
Start: 2025-08-19

## 2025-08-19 RX ORDER — ETRASIMOD 2 MG/1
TAKE 1 TABLET BY MOUTH ONCE DAILY TABLET, FILM COATED ORAL
Qty: 30 TABLET | Refills: 5 | Status: ACTIVE | COMMUNITY

## 2025-08-19 RX ORDER — ROSUVASTATIN CALCIUM 20 MG/1
1 TABLET TABLET, COATED ORAL ONCE A DAY
Status: ACTIVE | COMMUNITY

## 2025-08-19 RX ORDER — DIPHENHYDRAMINE HYDROCHLORIDE 25 MG/1
1 CAPSULE AT BEDTIME AS NEEDED CAPSULE ORAL ONCE A DAY
Status: ACTIVE | COMMUNITY

## 2025-08-19 RX ORDER — MONTELUKAST 10 MG/1
1 TABLET TABLET, FILM COATED ORAL ONCE A DAY
Status: ACTIVE | COMMUNITY

## 2025-08-19 RX ORDER — OZANIMOD HYDROCHLORIDE 0.92 MG/1
TAKE 1 CAPSULE BY MOUTH EVERY DAY CAPSULE ORAL ONCE A DAY
Qty: 90 CAPSULE | Refills: 3 | Status: ACTIVE | COMMUNITY